# Patient Record
Sex: FEMALE | Race: OTHER | NOT HISPANIC OR LATINO
[De-identification: names, ages, dates, MRNs, and addresses within clinical notes are randomized per-mention and may not be internally consistent; named-entity substitution may affect disease eponyms.]

---

## 2020-09-22 ENCOUNTER — APPOINTMENT (OUTPATIENT)
Dept: CARDIOLOGY | Facility: CLINIC | Age: 60
End: 2020-09-22
Payer: COMMERCIAL

## 2020-09-22 ENCOUNTER — NON-APPOINTMENT (OUTPATIENT)
Age: 60
End: 2020-09-22

## 2020-09-22 ENCOUNTER — OUTPATIENT (OUTPATIENT)
Dept: OUTPATIENT SERVICES | Facility: HOSPITAL | Age: 60
LOS: 1 days | End: 2020-09-22
Payer: COMMERCIAL

## 2020-09-22 VITALS
BODY MASS INDEX: 24.83 KG/M2 | SYSTOLIC BLOOD PRESSURE: 120 MMHG | WEIGHT: 149 LBS | TEMPERATURE: 98.3 F | HEIGHT: 65 IN | OXYGEN SATURATION: 98 % | HEART RATE: 76 BPM | DIASTOLIC BLOOD PRESSURE: 76 MMHG

## 2020-09-22 DIAGNOSIS — Z72.89 OTHER PROBLEMS RELATED TO LIFESTYLE: ICD-10-CM

## 2020-09-22 DIAGNOSIS — Z78.9 OTHER SPECIFIED HEALTH STATUS: ICD-10-CM

## 2020-09-22 DIAGNOSIS — Z83.42 FAMILY HISTORY OF FAMILIAL HYPERCHOLESTEROLEMIA: ICD-10-CM

## 2020-09-22 DIAGNOSIS — Z82.49 FAMILY HISTORY OF ISCHEMIC HEART DISEASE AND OTHER DISEASES OF THE CIRCULATORY SYSTEM: ICD-10-CM

## 2020-09-22 PROBLEM — Z00.00 ENCOUNTER FOR PREVENTIVE HEALTH EXAMINATION: Status: ACTIVE | Noted: 2020-09-22

## 2020-09-22 PROCEDURE — 93454 CORONARY ARTERY ANGIO S&I: CPT | Mod: 26,59

## 2020-09-22 PROCEDURE — 99205 OFFICE O/P NEW HI 60 MIN: CPT | Mod: 57

## 2020-09-22 PROCEDURE — 92928 PRQ TCAT PLMT NTRAC ST 1 LES: CPT | Mod: LD

## 2020-09-22 PROCEDURE — 99285 EMERGENCY DEPT VISIT HI MDM: CPT

## 2020-09-22 PROCEDURE — 71045 X-RAY EXAM CHEST 1 VIEW: CPT | Mod: 26

## 2020-09-22 PROCEDURE — 93000 ELECTROCARDIOGRAM COMPLETE: CPT

## 2020-09-22 PROCEDURE — 93571 IV DOP VEL&/PRESS C FLO 1ST: CPT | Mod: 26,LD

## 2020-09-22 PROCEDURE — 92978 ENDOLUMINL IVUS OCT C 1ST: CPT | Mod: 26,LD

## 2020-09-22 PROCEDURE — 93010 ELECTROCARDIOGRAM REPORT: CPT | Mod: 76

## 2020-09-22 RX ORDER — TRAZODONE HYDROCHLORIDE 50 MG/1
50 TABLET ORAL
Refills: 0 | Status: ACTIVE | COMMUNITY

## 2020-09-22 NOTE — REASON FOR VISIT
[Initial Evaluation] : an initial evaluation of [Chest Pain] : chest pain [Hyperlipidemia] : hyperlipidemia [Admitted for Heart Failure] : patient was not admitted for heart failure [FreeTextEntry1] : concern for unstable angina

## 2020-09-22 NOTE — HISTORY OF PRESENT ILLNESS
[FreeTextEntry1] : 59 year old female with self reported history of mixed hyperlipidemia on praluent uncontrolled recently doubled dose, "cardiac calcifications" here for evaluation of acute onset of chest pressure which occurred Saturday while in [place] with residual soreness. It was a 6/10 radiating to throat and back. She had spoken to a cardiologist in [place] who recommended to "rule out a heart attack" and go to the emergency department. She arrived here in [place] yesterday (Monday night).\par \par She describes the pain as starting in the throat and traveling to chest and back. She had no recurrence since but reports her chest now feels "sore."\par \par EKG today without ischemic changes. Patient denies shortness of breath, orthopnea, PND, LE edema, syncope, near syncope.\par \par We do not know her prior lipid profile but she reports it is still uncontrolled and recently increased the dose. \par

## 2020-09-23 PROCEDURE — 93010 ELECTROCARDIOGRAM REPORT: CPT

## 2020-09-23 PROCEDURE — 93306 TTE W/DOPPLER COMPLETE: CPT | Mod: 26

## 2020-09-23 PROCEDURE — 99233 SBSQ HOSP IP/OBS HIGH 50: CPT

## 2020-09-28 ENCOUNTER — APPOINTMENT (OUTPATIENT)
Dept: CARDIOLOGY | Facility: CLINIC | Age: 60
End: 2020-09-28
Payer: COMMERCIAL

## 2020-09-28 VITALS
HEIGHT: 64 IN | SYSTOLIC BLOOD PRESSURE: 114 MMHG | HEART RATE: 70 BPM | WEIGHT: 140 LBS | BODY MASS INDEX: 23.9 KG/M2 | DIASTOLIC BLOOD PRESSURE: 70 MMHG | OXYGEN SATURATION: 97 % | TEMPERATURE: 98 F

## 2020-09-28 PROCEDURE — 99214 OFFICE O/P EST MOD 30 MIN: CPT

## 2020-09-28 NOTE — HISTORY OF PRESENT ILLNESS
[FreeTextEntry1] : 59 year old female with self reported history of uncontrolled familial hyperlipidemia on praluent recently doubled dose here for post coronary stent follow up. Initially seen last week and sent to ER for unstable angina. Mid LAD FFR positive lesion s/p PCI. Residual non obstructive RCA and distal LCx. Preserved LVEF. Right radial artery access. There is mild eccyhmosis at the access site but no alarm signs. Patent pulses and perfusion, not significantly tender. She reports resolution of the unstable angina symptoms but does report mild soreness in her chest ever since the stent deployment. This has not changed and has no exertional pattern. No angina. Patient denies shortness of breath, orthopnea, PND, LE edema, syncope, near syncope. Tolerating DAPT and praluent. Toprol very expensive. Pending cardiac rehab.\par \par LDL is 181. Scanning in prior cardiology records from [place]. Last LDL in 8/2020 was 160s. Refill of praluent was pending and she was off it for a couple weeks. Peak LDL was 234 and recent shawna 160s.\par

## 2020-09-28 NOTE — REVIEW OF SYSTEMS
[Negative] : Heme/Lymph [Shortness Of Breath] : no shortness of breath [Chest  Pressure] : no chest pressure [Chest Pain] : no chest pain

## 2020-09-28 NOTE — ASSESSMENT
[FreeTextEntry1] : Continue DAPT and higher dose praluent. Cardiac rehab ordered. On BB for now, if no chest pain we can stop this at next visit given preserved LVEF. BP controlled.\par \par Prior records from cardiologist in [place] reviewed. Intolerant to statins and ezetemibe. She recently doubled the dose of praluent and is compliant. She had revascularization of mid LAD without recurrence of her unstable angina symptoms. Asymptomatic from a cardiac standpoint. TTE unremarkable. Right radial artery access site with mild pain and bruising but no redness/swelling, has full ROM, there is no bruit upon auscultation, and patent distal pulses. Can obtain RUE arterial ultrasound if there is persistent mild pain.\par \par Return to clinic mid December with preclinic lipid profile.

## 2020-09-28 NOTE — REASON FOR VISIT
[Chest Pain] : chest pain [Hyperlipidemia] : hyperlipidemia [Follow-Up - Clinic] : a clinic follow-up of [Admitted for Heart Failure] : patient was not admitted for heart failure [FreeTextEntry1] : recent unstable angina, post coronary stent

## 2020-09-28 NOTE — PHYSICAL EXAM
[General Appearance - Well Developed] : well developed [Normal Appearance] : normal appearance [Well Groomed] : well groomed [General Appearance - Well Nourished] : well nourished [No Deformities] : no deformities [General Appearance - In No Acute Distress] : no acute distress [Normal Conjunctiva] : the conjunctiva exhibited no abnormalities [Eyelids - No Xanthelasma] : the eyelids demonstrated no xanthelasmas [Normal Oral Mucosa] : normal oral mucosa [No Oral Pallor] : no oral pallor [No Oral Cyanosis] : no oral cyanosis [Normal Jugular Venous A Waves Present] : normal jugular venous A waves present [Normal Jugular Venous V Waves Present] : normal jugular venous V waves present [No Jugular Venous Martinez A Waves] : no jugular venous martinez A waves [Respiration, Rhythm And Depth] : normal respiratory rhythm and effort [Exaggerated Use Of Accessory Muscles For Inspiration] : no accessory muscle use [Auscultation Breath Sounds / Voice Sounds] : lungs were clear to auscultation bilaterally [Heart Rate And Rhythm] : heart rate and rhythm were normal [Heart Sounds] : normal S1 and S2 [Murmurs] : no murmurs present [Abdomen Soft] : soft [Abdomen Tenderness] : non-tender [Abdomen Mass (___ Cm)] : no abdominal mass palpated [Abnormal Walk] : normal gait [Gait - Sufficient For Exercise Testing] : the gait was sufficient for exercise testing [Nail Clubbing] : no clubbing of the fingernails [Cyanosis, Localized] : no localized cyanosis [Petechial Hemorrhages (___cm)] : no petechial hemorrhages [Skin Color & Pigmentation] : normal skin color and pigmentation [] : no rash [No Venous Stasis] : no venous stasis [Skin Lesions] : no skin lesions [Oriented To Time, Place, And Person] : oriented to person, place, and time [Affect] : the affect was normal [Mood] : the mood was normal [No Anxiety] : not feeling anxious [FreeTextEntry1] : Right radial artery access site with mild pain and bruising but no redness/swelling, has full ROM, there is no bruit upon auscultation, and patent distal pulses.

## 2020-12-14 ENCOUNTER — APPOINTMENT (OUTPATIENT)
Dept: CARDIOLOGY | Facility: CLINIC | Age: 60
End: 2020-12-14
Payer: COMMERCIAL

## 2020-12-14 VITALS
TEMPERATURE: 98.2 F | WEIGHT: 150 LBS | HEART RATE: 78 BPM | OXYGEN SATURATION: 97 % | SYSTOLIC BLOOD PRESSURE: 128 MMHG | HEIGHT: 65 IN | DIASTOLIC BLOOD PRESSURE: 78 MMHG | BODY MASS INDEX: 24.99 KG/M2

## 2020-12-14 DIAGNOSIS — Z86.79 PERSONAL HISTORY OF OTHER DISEASES OF THE CIRCULATORY SYSTEM: ICD-10-CM

## 2020-12-14 PROCEDURE — 99214 OFFICE O/P EST MOD 30 MIN: CPT

## 2020-12-14 PROCEDURE — 99215 OFFICE O/P EST HI 40 MIN: CPT

## 2020-12-14 PROCEDURE — 99072 ADDL SUPL MATRL&STAF TM PHE: CPT

## 2020-12-14 RX ORDER — METOPROLOL SUCCINATE 25 MG/1
25 TABLET, EXTENDED RELEASE ORAL
Refills: 0 | Status: DISCONTINUED | COMMUNITY
End: 2020-12-14

## 2020-12-14 NOTE — HISTORY OF PRESENT ILLNESS
[FreeTextEntry1] : 60 year old female with uncontrolled familial hyperlipidemia on praluent recently doubled dose and HTN here for follow up after labwork. \par \par Since last visit, patient denies chest pressure on exertion, shortness of breath, orthopnea, PND, LE edema, syncope, near syncope.  (was 181 off praluent from 160) on 11/30/20 on praluent. Compliant with medications. Tolerating DAPT. Cardiac rehab report is progressing well, . Peak LDL was 234. \par \par From prior visits: "Initially seen [9/2020] and sent to ER for unstable angina. Mid LAD FFR positive lesion s/p PCI. Residual non obstructive RCA and distal LCx. Preserved LVEF. Right radial artery access."\par

## 2020-12-14 NOTE — REASON FOR VISIT
[Follow-Up - Clinic] : a clinic follow-up of [Hyperlipidemia] : hyperlipidemia [Hypertension] : hypertension [Medication Management] : Medication management [Coronary Artery Disease] : coronary artery disease [Admitted for Heart Failure] : patient was not admitted for heart failure [FreeTextEntry1] : cardiac rehab

## 2020-12-14 NOTE — REVIEW OF SYSTEMS
[Negative] : Heme/Lymph [Shortness Of Breath] : no shortness of breath [Chest  Pressure] : no chest pressure [Palpitations] : no palpitations

## 2020-12-14 NOTE — ASSESSMENT
[FreeTextEntry1] : \par 60 year old female with uncontrolled familial hyperlipidemia on praluent recently doubled dose and HTN here for follow up after labwork.  (was 181 off praluent from 160) on 11/30/20 on praluent. Compliant with medications. Tolerating DAPT. Cardiac rehab report is progressing well, . Peak LDL was 234. \par \par \par Continue DAPT. Switch praluent to repatha and she is praluent resistant. Cardiac rehab doing well;continue. SBP mildly uncontrolled today and was 140 at cardiac rehab. Switch BB to amlodipine 10.\par \par \par From prior records, she is intolerant to statins and zetia. LDL still uncontrolled on double dose of praluent she is praluent resistant. Will switch to repatha. Asymptomatic from a cardiac standpoint. TTE unremarkable. \par \par \par Return to clinic in 4 months and as tolerated with preclinic lipid profile. ER precautions given to patient.

## 2020-12-14 NOTE — PHYSICAL EXAM
[General Appearance - Well Developed] : well developed [Normal Appearance] : normal appearance [Well Groomed] : well groomed [General Appearance - Well Nourished] : well nourished [No Deformities] : no deformities [General Appearance - In No Acute Distress] : no acute distress [Normal Conjunctiva] : the conjunctiva exhibited no abnormalities [Eyelids - No Xanthelasma] : the eyelids demonstrated no xanthelasmas [Normal Oral Mucosa] : normal oral mucosa [No Oral Pallor] : no oral pallor [No Oral Cyanosis] : no oral cyanosis [Normal Jugular Venous A Waves Present] : normal jugular venous A waves present [Normal Jugular Venous V Waves Present] : normal jugular venous V waves present [No Jugular Venous Martinez A Waves] : no jugular venous martinez A waves [Respiration, Rhythm And Depth] : normal respiratory rhythm and effort [Exaggerated Use Of Accessory Muscles For Inspiration] : no accessory muscle use [Auscultation Breath Sounds / Voice Sounds] : lungs were clear to auscultation bilaterally [Heart Rate And Rhythm] : heart rate and rhythm were normal [Heart Sounds] : normal S1 and S2 [Murmurs] : no murmurs present [Abdomen Soft] : soft [Abdomen Tenderness] : non-tender [Abdomen Mass (___ Cm)] : no abdominal mass palpated [Abnormal Walk] : normal gait [Gait - Sufficient For Exercise Testing] : the gait was sufficient for exercise testing [Nail Clubbing] : no clubbing of the fingernails [Cyanosis, Localized] : no localized cyanosis [Petechial Hemorrhages (___cm)] : no petechial hemorrhages [Skin Color & Pigmentation] : normal skin color and pigmentation [] : no rash [No Venous Stasis] : no venous stasis [Skin Lesions] : no skin lesions [Oriented To Time, Place, And Person] : oriented to person, place, and time [Affect] : the affect was normal [Mood] : the mood was normal [No Anxiety] : not feeling anxious [FreeTextEntry1] : full range of motion

## 2021-01-26 ENCOUNTER — EMERGENCY (EMERGENCY)
Facility: HOSPITAL | Age: 61
LOS: 1 days | Discharge: HOME | End: 2021-01-26
Admitting: EMERGENCY MEDICINE
Payer: COMMERCIAL

## 2021-01-26 VITALS
TEMPERATURE: 99 F | DIASTOLIC BLOOD PRESSURE: 80 MMHG | HEART RATE: 78 BPM | SYSTOLIC BLOOD PRESSURE: 124 MMHG | RESPIRATION RATE: 16 BRPM | OXYGEN SATURATION: 98 %

## 2021-01-26 DIAGNOSIS — Z20.822 CONTACT WITH AND (SUSPECTED) EXPOSURE TO COVID-19: ICD-10-CM

## 2021-01-26 LAB — SARS-COV-2 RNA SPEC QL NAA+PROBE: SIGNIFICANT CHANGE UP

## 2021-01-26 PROCEDURE — U0005: CPT

## 2021-01-26 PROCEDURE — U0003: CPT

## 2021-01-26 PROCEDURE — 99283 EMERGENCY DEPT VISIT LOW MDM: CPT

## 2021-01-26 NOTE — ED PROVIDER NOTE - PATIENT PORTAL LINK FT
You can access the FollowMyHealth Patient Portal offered by [facility] by registering at the following website: [email or web address]. By joining [facility]’s FollowMyHealth portal, you will also be able to view your health information using other applications (apps) compatible with our system.

## 2021-02-12 ENCOUNTER — NON-APPOINTMENT (OUTPATIENT)
Age: 61
End: 2021-02-12

## 2021-02-17 ENCOUNTER — NON-APPOINTMENT (OUTPATIENT)
Age: 61
End: 2021-02-17

## 2021-03-09 ENCOUNTER — NON-APPOINTMENT (OUTPATIENT)
Age: 61
End: 2021-03-09

## 2021-05-12 ENCOUNTER — NON-APPOINTMENT (OUTPATIENT)
Age: 61
End: 2021-05-12

## 2021-05-12 ENCOUNTER — APPOINTMENT (OUTPATIENT)
Dept: CARDIOLOGY | Facility: CLINIC | Age: 61
End: 2021-05-12
Payer: COMMERCIAL

## 2021-05-12 VITALS
HEIGHT: 65 IN | OXYGEN SATURATION: 98 % | HEART RATE: 82 BPM | DIASTOLIC BLOOD PRESSURE: 70 MMHG | SYSTOLIC BLOOD PRESSURE: 110 MMHG | BODY MASS INDEX: 25.33 KG/M2 | WEIGHT: 152 LBS

## 2021-05-12 PROCEDURE — 99072 ADDL SUPL MATRL&STAF TM PHE: CPT

## 2021-05-12 PROCEDURE — 93000 ELECTROCARDIOGRAM COMPLETE: CPT

## 2021-05-12 PROCEDURE — 93242 EXT ECG>48HR<7D RECORDING: CPT

## 2021-05-12 PROCEDURE — 99214 OFFICE O/P EST MOD 30 MIN: CPT

## 2021-05-12 RX ORDER — ASPIRIN 81 MG
81 TABLET, DELAYED RELEASE (ENTERIC COATED) ORAL DAILY
Refills: 0 | Status: DISCONTINUED | COMMUNITY
End: 2021-05-12

## 2021-05-12 RX ORDER — ALIROCUMAB 150 MG/ML
150 INJECTION, SOLUTION SUBCUTANEOUS
Refills: 0 | Status: DISCONTINUED | COMMUNITY
End: 2021-05-12

## 2021-05-12 RX ORDER — ALIROCUMAB 150 MG/ML
150 INJECTION, SOLUTION SUBCUTANEOUS
Qty: 2 | Refills: 0 | Status: DISCONTINUED | COMMUNITY
Start: 2020-07-16 | End: 2021-05-12

## 2021-05-13 NOTE — REASON FOR VISIT
[Follow-Up - Clinic] : a clinic follow-up of [Coronary Artery Disease] : coronary artery disease [Hyperlipidemia] : hyperlipidemia [Hypertension] : hypertension [Medication Management] : Medication management [Admitted for Heart Failure] : patient was not admitted for heart failure [FreeTextEntry1] : cardiac rehab

## 2021-05-13 NOTE — ASSESSMENT
[FreeTextEntry1] : BARB MANUEL  is a 60 year F  who presents today May 12, 2021 with the above history and the following active issues. \par \par Uncontrolled familial hyperlipidemia now off praluent and tolerating Repatha. Compliant with medications. Follow-up  blood work scheduled in the near future. Low cholesterol diet. Lifestyle and risk factor modification. \par From prior records, she is intolerant to statins and zetia. \par \par CAD status post PCI in September 2020. Now complaining of atypical chest pain. Intermittently occuring. 4/10 in intensity. Chest pain free in the office. EKG NSR. Recommend ischemic evaluation with nuclear stress test. \par If pain recurs ER evaluation. Remains on DAPT. Bleeding precautions reviewed. \par \par Palpitations. 1 week ZIO monitor recommended. Avoid stimulants. \par \par Red flag symptoms which would warrant sooner emergent evaluation reviewed with the patient. \par Questions and concerns were addressed and answered. \par \par Sincerely,\par \par Lashell Epps PA-C\par Patients history, testing and plan reviewed with supervising MD: Dr. Crisostomo\par All of the above reviewed with patients cardiologist Dr. Jay over the phone

## 2021-05-13 NOTE — HISTORY OF PRESENT ILLNESS
[FreeTextEntry1] : BARB MANUEL  is a 60 year F  who presents today May 12, 2021 in clinical follow-up. Medications have remained unchanged. She reports intermittent chest pressure to the midsternal region over the past 4 days. There are no aggravating or alleviating factors. Pain is not exertional. She is walking her dogs on a regular basis without complaints. Remains on DAPT without interruption. Now on Repatha and is scheduled to have follow-up blood work with her cardiologist in [place] in the coming month. She also has been experiencing episodes of palpitations described as irregularity in her heartbeat.\par Completed cardiac rehab.\par \par 60 year old female with uncontrolled familial hyperlipidemia on praluent now taking Repatha. \par Initially seen [9/2020] and sent to ER for unstable angina. Mid LAD FFR positive lesion s/p PCI. Residual non obstructive RCA and distal LCx. Preserved LVEF. Right radial artery access.\par \par \par \par

## 2021-05-24 ENCOUNTER — NON-APPOINTMENT (OUTPATIENT)
Age: 61
End: 2021-05-24

## 2021-05-25 ENCOUNTER — APPOINTMENT (OUTPATIENT)
Dept: CARDIOLOGY | Facility: CLINIC | Age: 61
End: 2021-05-25

## 2021-05-28 ENCOUNTER — NON-APPOINTMENT (OUTPATIENT)
Age: 61
End: 2021-05-28

## 2021-06-01 PROCEDURE — 99072 ADDL SUPL MATRL&STAF TM PHE: CPT

## 2021-06-01 PROCEDURE — 93244 EXT ECG>48HR<7D REV&INTERPJ: CPT

## 2021-06-02 ENCOUNTER — NON-APPOINTMENT (OUTPATIENT)
Age: 61
End: 2021-06-02

## 2024-03-21 ENCOUNTER — NON-APPOINTMENT (OUTPATIENT)
Age: 64
End: 2024-03-21

## 2024-03-21 ENCOUNTER — APPOINTMENT (OUTPATIENT)
Dept: CARDIOLOGY | Facility: CLINIC | Age: 64
End: 2024-03-21
Payer: COMMERCIAL

## 2024-03-21 VITALS
HEART RATE: 82 BPM | DIASTOLIC BLOOD PRESSURE: 78 MMHG | SYSTOLIC BLOOD PRESSURE: 148 MMHG | HEIGHT: 65 IN | WEIGHT: 150 LBS | BODY MASS INDEX: 24.99 KG/M2 | OXYGEN SATURATION: 96 %

## 2024-03-21 DIAGNOSIS — M79.605 PAIN IN LEFT LEG: ICD-10-CM

## 2024-03-21 DIAGNOSIS — M79.89 OTHER SPECIFIED SOFT TISSUE DISORDERS: ICD-10-CM

## 2024-03-21 PROCEDURE — 93000 ELECTROCARDIOGRAM COMPLETE: CPT

## 2024-03-21 PROCEDURE — 99214 OFFICE O/P EST MOD 30 MIN: CPT

## 2024-03-21 RX ORDER — TESTOSTERONE CYPIONATE 100 MG/ML
100 VIAL (ML) INTRAMUSCULAR
Refills: 0 | Status: DISCONTINUED | COMMUNITY
End: 2024-03-21

## 2024-03-21 RX ORDER — AMLODIPINE BESYLATE 5 MG/1
5 TABLET ORAL DAILY
Refills: 0 | Status: DISCONTINUED | COMMUNITY
Start: 2020-12-14 | End: 2024-03-21

## 2024-03-21 RX ORDER — EVOLOCUMAB 140 MG/ML
140 INJECTION, SOLUTION SUBCUTANEOUS
Qty: 6 | Refills: 3 | Status: DISCONTINUED | COMMUNITY
Start: 2020-12-14 | End: 2024-03-21

## 2024-03-21 RX ORDER — CLOPIDOGREL BISULFATE 75 MG/1
75 TABLET, FILM COATED ORAL DAILY
Qty: 90 | Refills: 0 | Status: DISCONTINUED | COMMUNITY
End: 2024-03-21

## 2024-04-19 ENCOUNTER — APPOINTMENT (OUTPATIENT)
Dept: VASCULAR SURGERY | Facility: CLINIC | Age: 64
End: 2024-04-19
Payer: COMMERCIAL

## 2024-04-19 ENCOUNTER — APPOINTMENT (OUTPATIENT)
Dept: CARDIOLOGY | Facility: CLINIC | Age: 64
End: 2024-04-19
Payer: COMMERCIAL

## 2024-04-19 VITALS
HEART RATE: 80 BPM | BODY MASS INDEX: 23.32 KG/M2 | DIASTOLIC BLOOD PRESSURE: 74 MMHG | OXYGEN SATURATION: 100 % | HEIGHT: 65 IN | SYSTOLIC BLOOD PRESSURE: 142 MMHG | WEIGHT: 140 LBS

## 2024-04-19 VITALS
HEIGHT: 65 IN | WEIGHT: 140 LBS | BODY MASS INDEX: 23.32 KG/M2 | SYSTOLIC BLOOD PRESSURE: 142 MMHG | RESPIRATION RATE: 14 BRPM | HEART RATE: 80 BPM | OXYGEN SATURATION: 100 % | DIASTOLIC BLOOD PRESSURE: 74 MMHG

## 2024-04-19 PROCEDURE — 99203 OFFICE O/P NEW LOW 30 MIN: CPT

## 2024-04-19 PROCEDURE — 99214 OFFICE O/P EST MOD 30 MIN: CPT

## 2024-04-19 RX ORDER — BEMPEDOIC ACID 180 MG/1
180 TABLET, FILM COATED ORAL
Qty: 90 | Refills: 3 | Status: DISCONTINUED | COMMUNITY
End: 2024-04-19

## 2024-04-20 NOTE — ASSESSMENT
[FreeTextEntry1] : BARB MANUEL is a 63 year old F who presents today for cardiovascular prevention visit, lipid management.   CAD, prior PCI LAD 9/2020 Pt concerned about residual CAD Although she is asymptomatic given residua disease recommend nuclear stress testing to r/o obx CAD, will arrange followup in  office to review with her general cardiologist.  Cont asa, lipid lowering as below  Familial HLD Longstanding uncontrolled HLD Last  with known CAD Currently only on nexletol Intol several statin, zetia, and PCSK9i mAb Patient is not at goal for LDL (LEQVIO (inclisiran) injection is indicated as an adjunct to diet and maximally tolerated statin therapy for the treatment of adults with clinical atherosclerotic cardiovascular disease (ASCVD) or heterozygous familial hypercholesterolemia (HeFH) who require additional lowering of low-density lipoprotein cholesterol (LDL-C).  Will submit for auth and next CVP visit for initial injection in   Thank you for the referral.  Please do not hesitate to call for any questions or concerns.   Sincerely,  JOSUE Flor-C Patients history, testing, and plan reviewed with supervising MD: Dr. Benito Marques

## 2024-04-20 NOTE — HISTORY OF PRESENT ILLNESS
[FreeTextEntry1] : BARB MANUEL is a 63 year old F who presents today for cardiovascular prevention visit.   PMH CAD, PCI to LAD 9/2020, familial HLD presents to discuss cholesterol management.   Reviewed cardiac catheterization 9/2020 successful PCI to LAD lesion with residual 65% LCX and 50% RCA stenosis.  Her lipids have been elevated given multiple med intolerances over the years. Has had management in her other home in [place] but plans to move to [facility] full time.  She reports trying 5 different statins all which resulted in body pain and joint aches.  She reports zetia had similar effect.  She was recently started on nexletol which she seems to tolerate well.  She tried both Praluent and Repatha which resulted in flu like symptoms.   Last lipids 2/2024   She otherwise feels well. Remains active. She denies chest pain, pressure, palpitations, unusual shortness of breath, orthopnea, LE edema, lightheadedness, dizziness, near syncope or syncope. Continues to take aspirin.

## 2024-05-02 ENCOUNTER — APPOINTMENT (OUTPATIENT)
Dept: CARDIOLOGY | Facility: CLINIC | Age: 64
End: 2024-05-02

## 2024-05-07 ENCOUNTER — APPOINTMENT (OUTPATIENT)
Dept: CARDIOLOGY | Facility: CLINIC | Age: 64
End: 2024-05-07
Payer: COMMERCIAL

## 2024-05-07 ENCOUNTER — NON-APPOINTMENT (OUTPATIENT)
Age: 64
End: 2024-05-07

## 2024-05-07 PROCEDURE — 93015 CV STRESS TEST SUPVJ I&R: CPT

## 2024-05-07 PROCEDURE — A9502: CPT

## 2024-05-07 PROCEDURE — 78452 HT MUSCLE IMAGE SPECT MULT: CPT

## 2024-05-08 PROBLEM — I83.90 VARICOSITIES OF LEG: Status: ACTIVE | Noted: 2024-03-21

## 2024-05-08 PROBLEM — R00.2 PALPITATIONS: Status: ACTIVE | Noted: 2021-05-12

## 2024-05-09 ENCOUNTER — APPOINTMENT (OUTPATIENT)
Dept: CARDIOLOGY | Facility: CLINIC | Age: 64
End: 2024-05-09
Payer: COMMERCIAL

## 2024-05-09 VITALS
HEIGHT: 65 IN | DIASTOLIC BLOOD PRESSURE: 62 MMHG | SYSTOLIC BLOOD PRESSURE: 120 MMHG | OXYGEN SATURATION: 99 % | BODY MASS INDEX: 23.16 KG/M2 | WEIGHT: 139 LBS | HEART RATE: 78 BPM

## 2024-05-09 DIAGNOSIS — R00.2 PALPITATIONS: ICD-10-CM

## 2024-05-09 DIAGNOSIS — I83.90 ASYMPTOMATIC VARICOSE VEINS OF UNSPECIFIED LOWER EXTREMITY: ICD-10-CM

## 2024-05-09 PROCEDURE — 99214 OFFICE O/P EST MOD 30 MIN: CPT

## 2024-05-11 NOTE — HISTORY OF PRESENT ILLNESS
[FreeTextEntry1] : 64-year-old female with familial hypercholesterolemia previously treated with Praluent and Repatha due to statin and ezetimibe intolerance now back on low-dose rosuvastatin 5 mg every other day, aortic and coronary calcifications, family history of premature CAD unable to achieve LDL target, ACS 9/2020 with diffuse 65% proximal LAD, borderline IFR 0.9, FFR 0.71 with placement of 3.5X 38 mm MEHDI to proximal LAD, residual 60% focal ostial D1 , 65% distal LCx and 50% diffuse mid RCA disease.    She just returned from [place].  Overall there is no change in her functional capacity.  She remains very active doing Pilates, yoga and walking her dogs.  These activities generally well-tolerated without chest discomfort or shortness of breath.  She is very careful with the diet.  Failed therapy with Repatha and Praluent due to flu like symptoms and has been back on rosuvastatin 5 mg.  As previously this is not well-tolerated.  She is having myalgias, aches and stiffness of her joints.  An additional problem is discomfort, swelling/varicositiesof her left lower extremity.  Pain is in the back of her leg and extends into her groin.  She has had sclerotherapy of the lower extremities.  Laboratory from 2/29/2024 shows total cholesterol 269, HDL 75, triglycerides 64 and .  Thyroid function tests including TSH T4 and T3 are normal.  Hemoglobin is 14.9, hematocrit 44.5%.  355.  Potassium is 4.6, sodium 136, creatinine 0.64, EGFR 100 calcium 9.9, LFTs are normal.  FIB-4 index is 0.71 which is considered optimal.  Hemoglobin A1c is 5.3

## 2024-05-11 NOTE — HISTORY OF PRESENT ILLNESS
[FreeTextEntry1] : 64-year-old female with familial hypercholesterolemia previously treated with Praluent and Repatha due to statin and ezetimibe intolerance now back on low-dose rosuvastatin 5 mg every other day, aortic and coronary calcifications, family history of premature CAD unable to achieve LDL target, ACS 9/2020 with diffuse 65% proximal LAD, borderline IFR 0.9, FFR 0.71 with placement of 3.5X 38 mm MEHDI to proximal LAD, residual 60% focal ostial D1 , 65% distal LCx and 50% diffuse mid RCA disease.    She just returned from [place].  Overall there is no change in her functional capacity.  She remains very active doing Pilates, yoga and walking her dogs.  These activities generally well-tolerated without chest discomfort or shortness of breath.  She is very careful with the diet.  Failed therapy with Repatha and Praluent due to flu like symptoms and has been back on rosuvastatin 5 mg.  As previously this is not well-tolerated.  She is having myalgias, aches and stiffness of her joints.  An additional problem is discomfort, swelling/varicositiesof her left lower extremity.  Pain is in the back of her leg and extends into her groin.  She has had sclerotherapy of the lower extremities.  Laboratory from 2/29/2024 shows total cholesterol 269, HDL 75, triglycerides 64 and .  Thyroid function tests including TSH T4 and T3 are normal.  Hemoglobin is 14.9, hematocrit 44.5%.  355.  Potassium is 4.6, sodium 136, creatinine 0.64, EGFR 100 calcium 9.9, LFTs are normal.  FIB-4 index is 0.71 which is considered optimal.  Hemoglobin A1c is 5.3

## 2024-05-11 NOTE — CARDIOLOGY SUMMARY
[de-identified] : 3/21/2024  Sinus Rhythm, rate 78  Low voltage in precordial leads. -Left atrial enlargement.  ABNORMAL [de-identified] : 12/13/2023 sinus, 1.4% VPC's, no SVT or pauses [de-identified] : 12/2016  Normal stress echo, 13.5 METS [de-identified] : 10/23/2023 , HDL 77 and total cholesterol 206

## 2024-05-11 NOTE — ASSESSMENT
[FreeTextEntry1] : BARB MANUEL  is a 60 year F  with uncontrolled familial hyperlipidemia now off praluent and Repatha.  She is not tolerating repeat trial with low-dose statin.  There is also history of intolerance to ezetimibe.  Plan is referral to cardiovascular prevention clinic for trial with Leqvio.  Pending initiation of Leqvio she was provided with samples of bempedoic acid 10 mg daily.  We also reviewed the importance of maintaining a healthy lifestyle including regular exercise diet and achieving ideal weight.  A new problem swelling and pain of her left lower extremity.  I have arranged for a ultrasound duplex of the left lower extremity to be done today.  She has a history of multiple interventions on her lower extremity veins and referral will also be made to vascular surgery.  We will check the results of her lower extremity duplex today and schedule cardiology follow-up will be in 1 month or sooner prn.

## 2024-05-11 NOTE — PHYSICAL EXAM
[Well Developed] : well developed [Well Nourished] : well nourished [No Acute Distress] : no acute distress [Normal Conjunctiva] : normal conjunctiva [Normal Venous Pressure] : normal venous pressure [No Carotid Bruit] : no carotid bruit [Normal S1, S2] : normal S1, S2 [No Murmur] : no murmur [No Rub] : no rub [No Gallop] : no gallop [Clear Lung Fields] : clear lung fields [Good Air Entry] : good air entry [No Respiratory Distress] : no respiratory distress  [Soft] : abdomen soft [Non Tender] : non-tender [Normal Bowel Sounds] : normal bowel sounds [Normal Gait] : normal gait [No Edema] : no edema [No Cyanosis] : no cyanosis [No Clubbing] : no clubbing [Normal PT B/L] : normal PT B/L [Normal DP B/L] : normal DP B/L [No Rash] : no rash [Moves all extremities] : moves all extremities [No Focal Deficits] : no focal deficits [Normal Speech] : normal speech [Alert and Oriented] : alert and oriented [Normal memory] : normal memory [FreeTextEntry1] : full range of motion [de-identified] : Varicosities bilateral, L>R, left leg larger

## 2024-05-11 NOTE — CARDIOLOGY SUMMARY
[de-identified] : 3/21/2024  Sinus Rhythm, rate 78  Low voltage in precordial leads. -Left atrial enlargement.  ABNORMAL [de-identified] : 12/13/2023 sinus, 1.4% VPC's, no SVT or pauses [de-identified] : 12/2016  Normal stress echo, 13.5 METS [de-identified] : 10/23/2023 , HDL 77 and total cholesterol 206

## 2024-05-11 NOTE — PHYSICAL EXAM
[Well Developed] : well developed [Well Nourished] : well nourished [No Acute Distress] : no acute distress [Normal Conjunctiva] : normal conjunctiva [Normal Venous Pressure] : normal venous pressure [No Carotid Bruit] : no carotid bruit [Normal S1, S2] : normal S1, S2 [No Murmur] : no murmur [No Rub] : no rub [No Gallop] : no gallop [Clear Lung Fields] : clear lung fields [Good Air Entry] : good air entry [No Respiratory Distress] : no respiratory distress  [Soft] : abdomen soft [Non Tender] : non-tender [Normal Bowel Sounds] : normal bowel sounds [Normal Gait] : normal gait [No Edema] : no edema [No Cyanosis] : no cyanosis [No Clubbing] : no clubbing [Normal PT B/L] : normal PT B/L [Normal DP B/L] : normal DP B/L [No Rash] : no rash [Moves all extremities] : moves all extremities [No Focal Deficits] : no focal deficits [Normal Speech] : normal speech [Alert and Oriented] : alert and oriented [Normal memory] : normal memory [FreeTextEntry1] : full range of motion [de-identified] : Varicosities bilateral, L>R, left leg larger

## 2024-05-13 NOTE — DISCUSSION/SUMMARY
[FreeTextEntry1] : 1.  Maintain healthy lifestyle 2.  Continue present Rx if tolerated double metoprolol succinate to 50 mg daily. 3.  Left heart catheterization, coronary angiography and possible PCI/FFR. 4.  Referral to electrophysiology after coronary angiography. 5.  Waiting approval for inclisiran.   I spent 35 minutes with the patient including: 10 minutes preparing for the visit 15 minutes face to face and 10 minutes on documentation and coordination of care.

## 2024-05-13 NOTE — CARDIOLOGY SUMMARY
[de-identified] : 3/21/2024  Sinus Rhythm, rate 78  Low voltage in precordial leads. -Left atrial enlargement.  ABNORMAL [de-identified] : 12/13/2023 sinus, 1.4% VPC's, no SVT or pauses [de-identified] : 12/2016  Normal stress echo, 13.5 METS [de-identified] : MPI 5/7/2024.  She exercised for 5 minutes 18 seconds, 6 METS. No chest pain. LVEF post stress 66%. No definite ischemia or infarction but stopped with 5 second run of ventricular tachycardia, initially polymorphic, rate 214 bpm. [de-identified] : 10/23/2023 , HDL 77 and total cholesterol 206

## 2024-05-13 NOTE — PHYSICAL EXAM
[Well Developed] : well developed [Well Nourished] : well nourished [No Acute Distress] : no acute distress [Normal Conjunctiva] : normal conjunctiva [Normal Venous Pressure] : normal venous pressure [No Carotid Bruit] : no carotid bruit [Normal S1, S2] : normal S1, S2 [No Murmur] : no murmur [No Rub] : no rub [No Gallop] : no gallop [Clear Lung Fields] : clear lung fields [Good Air Entry] : good air entry [No Respiratory Distress] : no respiratory distress  [Soft] : abdomen soft [Non Tender] : non-tender [Normal Bowel Sounds] : normal bowel sounds [Normal Gait] : normal gait [No Edema] : no edema [No Cyanosis] : no cyanosis [No Clubbing] : no clubbing [Normal PT B/L] : normal PT B/L [Normal DP B/L] : normal DP B/L [No Rash] : no rash [Moves all extremities] : moves all extremities [No Focal Deficits] : no focal deficits [Normal Speech] : normal speech [Alert and Oriented] : alert and oriented [Normal memory] : normal memory [Appears Anxious] : appears anxious [FreeTextEntry1] : full range of motion [de-identified] : Varicosities bilateral, L>R, left leg larger

## 2024-05-13 NOTE — HISTORY OF PRESENT ILLNESS
[FreeTextEntry1] : 64-year-old female with familial hypercholesterolemia previously treated with Praluent and Repatha due to statin and ezetimibe intolerance now back on low-dose rosuvastatin 5 mg every other day, aortic and coronary calcifications, family history of premature CAD unable to achieve LDL target, ACS 9/2020 with diffuse 65% proximal LAD, borderline IFR 0.9, FFR 0.71 with placement of 3.5X 38 mm MEHDI to proximal LAD, residual 60% focal ostial D1 , 65% distal LCx and 50% diffuse mid RCA disease.    She just returned from [place].  Overall there is no change in her functional capacity.  She remains very active doing Pilates, yoga and walking her dogs.  These activities generally well-tolerated without chest discomfort or shortness of breath.  She is very careful with the diet.  Failed therapy with Repatha and Praluent due to flu like symptoms and resumed rosuvastatin 5 mg.  As previously not well-tolerated with myalgias, aches and stiffness of her joints.  An additional problem is discomfort, swelling/varicosities of left lower extremity.  Pain is in the back of leg and extends into groin.  She has had sclerotherapy of the lower extremities.  Laboratory from 2/29/2024 shows total cholesterol 269, HDL 75, triglycerides 64 and .  Thyroid function tests including TSH T4 and T3 are normal.  Hemoglobin is 14.9, hematocrit 44.5%.  355.  Potassium is 4.6, sodium 136, creatinine 0.64, EGFR 100 calcium 9.9, LFTs are normal.  FIB-4 index is 0.71 which is considered optimal.  Hemoglobin A1c is 5.3

## 2024-05-13 NOTE — ASSESSMENT
[FreeTextEntry1] : 60 year F with CAD, ACS 9/2020 with diffuse 65% proximal LAD, borderline IFR 0.9, FFR 0.71 with placement of 3.5X 38 mm MEHDI to proximal LAD, residual 60% focal ostial D1, 65% distal LCx and 50% diffuse mid RCA disease, uncontrolled familial hyperlipidemia with intolerance to multiple agents now on bempedoic acid with  mg/dl awaiting approval for inclisiran.  Recent MPI without scintigraphic evidence for ischemia or infarction but notable for 5-second run of non-sustained ventricular tachycardia rate 214 bpm initially polymorphic in appearance.

## 2024-05-16 DIAGNOSIS — Z01.818 ENCOUNTER FOR OTHER PREPROCEDURAL EXAMINATION: ICD-10-CM

## 2024-06-04 ENCOUNTER — APPOINTMENT (OUTPATIENT)
Dept: VASCULAR SURGERY | Facility: CLINIC | Age: 64
End: 2024-06-04

## 2024-06-05 ENCOUNTER — APPOINTMENT (OUTPATIENT)
Dept: CARDIOLOGY | Facility: CLINIC | Age: 64
End: 2024-06-05

## 2024-06-07 ENCOUNTER — APPOINTMENT (OUTPATIENT)
Dept: CARDIOLOGY | Facility: CLINIC | Age: 64
End: 2024-06-07
Payer: COMMERCIAL

## 2024-06-07 ENCOUNTER — APPOINTMENT (OUTPATIENT)
Dept: VASCULAR SURGERY | Facility: CLINIC | Age: 64
End: 2024-06-07

## 2024-06-07 ENCOUNTER — NON-APPOINTMENT (OUTPATIENT)
Age: 64
End: 2024-06-07

## 2024-06-07 VITALS
DIASTOLIC BLOOD PRESSURE: 60 MMHG | HEART RATE: 76 BPM | BODY MASS INDEX: 21.99 KG/M2 | SYSTOLIC BLOOD PRESSURE: 110 MMHG | WEIGHT: 132 LBS | OXYGEN SATURATION: 99 % | HEIGHT: 65 IN

## 2024-06-07 DIAGNOSIS — I47.29 OTHER VENTRICULAR TACHYCARDIA: ICD-10-CM

## 2024-06-07 DIAGNOSIS — Z95.5 PRESENCE OF CORONARY ANGIOPLASTY IMPLANT AND GRAFT: ICD-10-CM

## 2024-06-07 DIAGNOSIS — Z78.9 OTHER SPECIFIED HEALTH STATUS: ICD-10-CM

## 2024-06-07 DIAGNOSIS — I10 ESSENTIAL (PRIMARY) HYPERTENSION: ICD-10-CM

## 2024-06-07 PROCEDURE — 99215 OFFICE O/P EST HI 40 MIN: CPT

## 2024-06-07 PROCEDURE — G2211 COMPLEX E/M VISIT ADD ON: CPT

## 2024-06-07 PROCEDURE — 93000 ELECTROCARDIOGRAM COMPLETE: CPT

## 2024-06-07 RX ORDER — METOPROLOL SUCCINATE 25 MG/1
25 TABLET, EXTENDED RELEASE ORAL
Qty: 90 | Refills: 2 | Status: ACTIVE | COMMUNITY
Start: 1900-01-01 | End: 1900-01-01

## 2024-06-07 RX ORDER — METOPROLOL SUCCINATE 25 MG/1
25 TABLET, EXTENDED RELEASE ORAL DAILY
Qty: 180 | Refills: 3 | Status: DISCONTINUED | COMMUNITY
Start: 2020-09-28 | End: 2024-06-07

## 2024-06-07 RX ORDER — ACETAMINOPHEN/DIPHENHYDRAMINE 500MG-25MG
TABLET ORAL
Refills: 0 | Status: DISCONTINUED | COMMUNITY
End: 2024-06-07

## 2024-06-07 RX ORDER — ROSUVASTATIN CALCIUM 5 MG/1
5 TABLET, FILM COATED ORAL DAILY
Refills: 0 | Status: ACTIVE | COMMUNITY

## 2024-06-07 RX ORDER — ASPIRIN ENTERIC COATED TABLETS 81 MG 81 MG/1
81 TABLET, DELAYED RELEASE ORAL DAILY
Qty: 90 | Refills: 3 | Status: ACTIVE | COMMUNITY
Start: 2020-09-28 | End: 1900-01-01

## 2024-06-07 RX ORDER — MENTHOL/CAMPHOR 0.5 %-0.5%
1000 LOTION (ML) TOPICAL
Refills: 0 | Status: DISCONTINUED | COMMUNITY
End: 2024-06-07

## 2024-06-07 RX ORDER — ACETAMINOPHEN 325 MG
TABLET ORAL
Refills: 0 | Status: DISCONTINUED | COMMUNITY
End: 2024-06-07

## 2024-06-07 RX ORDER — BEMPEDOIC ACID 180 MG/1
180 TABLET, FILM COATED ORAL
Qty: 90 | Refills: 1 | Status: DISCONTINUED | COMMUNITY
Start: 2024-03-21 | End: 2024-06-07

## 2024-06-07 RX ORDER — DIAZEPAM 5 MG/1
5 TABLET ORAL
Qty: 4 | Refills: 0 | Status: DISCONTINUED | COMMUNITY
Start: 2024-05-07 | End: 2024-06-07

## 2024-06-07 NOTE — CARDIOLOGY SUMMARY
[de-identified] : 06/07/24: SR with LAE 03/21/24: SR with LAE [de-identified] : Remote Monitor (12/13/23): sinus, 1.4% VPC's, no SVT or pauses   [de-identified] : Nuclear Stress Test (05/2024):  1. Myocardial Perfusion: Abnormal. 2. ECG changes: Negative for ischemia by ST segment criteria, but 5 seconds of polymorphic VT at 214BPM. Metoprolol restarted. 3. Myocardial perfusion images show no definite perfusion abnormalities. 4. The post stress left ventricular EF is 66 %.  Exercise Stress Echo (12/2016): Normal. 13.5 METS [de-identified] : Labs (02/29/24): Total cholesterol 269, HDL 75, triglycerides 64 and . TSH T4 and T3 are normal. Hgb 14.9, Potassium 4.6, sodium 136, creatinine 0.64, EGFR 100 calcium 9.9, LFTs are normal. FIB-4 index is 0.71 which is considered optimal. Hemoglobin A1c is 5.3

## 2024-06-07 NOTE — REASON FOR VISIT
[FreeTextEntry3] :  Camilo Jay MD (Interventional Cardiologist) Stormy Hidalgo NP (Lipid Specialist)

## 2024-06-07 NOTE — DISCUSSION/SUMMARY
[FreeTextEntry1] :  63 F with FH, HTN, obstructive CAD s/p PCI of LAD and more recently ostial RCA on DAPT x 1 year (ASA / ticagrelor) with plans for Leqvio (appt Monday 06/10/24) -- presenting for post-cath check and further workup / management of NSVT.   Plan for exercise stress nuclear test (to compare to equivalent prior stress test) in six weeks + cardiac MRI to evaluate for scar / LGE of ischemia to ascertain etiology of VE.   Refilled ASA, ticagrelor per patient's preference. She has enough of the rosuvastatin 5 mg daily which may be discontinued if she keeps her Monday appt w/ Leqvio.   Labs ordered to be done prior to 2 month follow-up (after MRI and stress test)  Silverio Campos MD, [facility] Interventional Cardiology   Appreciate the opportunity to participate in the care of Ms. MANUEL. Strict ER precautions were provided to patient for symptoms that arise or worsen. Please do not hesitate to reach out with any questions, concerns, or changes in clinical status.   Silverio Campos MD, [facility]  Interventional & Clinical Cardiology  Altogether, I had the privilege of spending 50 minutes on this patient's care, more than 50% of which was during a face-to-face encounter. This does not include time required to obtain/interpret an ECG or time invested in the education of medical trainees who may have participated in the patient's care.   [EKG obtained to assist in diagnosis and management of assessed problem(s)] : EKG obtained to assist in diagnosis and management of assessed problem(s)

## 2024-06-07 NOTE — HISTORY OF PRESENT ILLNESS
[FreeTextEntry1] :  63 year old woman with HTN, FH, obstructive CAD c/b ACS s/p PCI of pLAD (3.5 x 38 mm, 09/2020) and recently ostial RCA (05/2024), NSVT, and statin intolerance who presents for post-cath follow-up.   Patient is highly active and travels frequently. Asymptomatic post cath. No palpitations at present. Discussed medications necessary post-PCI and further workup advised by Dr. Jay re ventricular ectopy. Additionally, patient was re-scheduled with Stormy Hidalgo NP on Monday 06/10/24 for Leqvio.

## 2024-06-08 ENCOUNTER — NON-APPOINTMENT (OUTPATIENT)
Age: 64
End: 2024-06-08

## 2024-06-10 ENCOUNTER — APPOINTMENT (OUTPATIENT)
Dept: CARDIOLOGY | Facility: CLINIC | Age: 64
End: 2024-06-10
Payer: COMMERCIAL

## 2024-06-10 VITALS
SYSTOLIC BLOOD PRESSURE: 114 MMHG | HEIGHT: 65 IN | HEART RATE: 76 BPM | OXYGEN SATURATION: 98 % | WEIGHT: 132 LBS | DIASTOLIC BLOOD PRESSURE: 76 MMHG | BODY MASS INDEX: 21.99 KG/M2

## 2024-06-10 DIAGNOSIS — Z87.898 PERSONAL HISTORY OF OTHER SPECIFIED CONDITIONS: ICD-10-CM

## 2024-06-10 DIAGNOSIS — E78.00 PURE HYPERCHOLESTEROLEMIA, UNSPECIFIED: ICD-10-CM

## 2024-06-10 DIAGNOSIS — E78.49 OTHER HYPERLIPIDEMIA: ICD-10-CM

## 2024-06-10 DIAGNOSIS — I25.10 ATHEROSCLEROTIC HEART DISEASE OF NATIVE CORONARY ARTERY W/OUT ANGINA PECTORIS: ICD-10-CM

## 2024-06-10 PROCEDURE — 99213 OFFICE O/P EST LOW 20 MIN: CPT | Mod: 25

## 2024-06-10 PROCEDURE — 96372 THER/PROPH/DIAG INJ SC/IM: CPT | Mod: JZ

## 2024-06-10 RX ORDER — INCLISIRAN 284 MG/1.5ML
284 INJECTION, SOLUTION SUBCUTANEOUS
Refills: 0 | Status: COMPLETED | OUTPATIENT
Start: 2024-06-10

## 2024-06-10 RX ADMIN — INCLISIRAN 1.5 MG/1.5ML: 284 INJECTION, SOLUTION SUBCUTANEOUS at 00:00

## 2024-06-10 NOTE — HISTORY OF PRESENT ILLNESS
[FreeTextEntry1] : BARB MANUEL is a 63 year old F who presents today for cardiovascular prevention visit.   PMH CAD, PCI to LAD 9/2020, familial HLD presents to discuss cholesterol management. Presents today for initial Leqvio shot.   Reviewed cardiac catheterization 9/2020 successful PCI to LAD lesion with residual 65% LCX and 50% RCA stenosis.  Her lipids have been elevated given multiple med intolerances over the years. Has had management in her other home in [place] but plans to move to [facility] full time.  She reports trying 5 different statins all which resulted in body pain and joint aches.  She reports zetia had similar effect.  She was recently started on nexletol which she seems to tolerate well.  She tried both Praluent and Repatha which resulted in flu like symptoms.  Last lipids 2/2024  Most recently had abnormal nuclear stress test, then cardiac cath with PCI to ostial RCA (05/2024). She has been taking low dose crestor 5mg daily since then.   She otherwise feels well. Remains active. She denies chest pain, pressure, palpitations, unusual shortness of breath, orthopnea, LE edema, lightheadedness, dizziness, near syncope or syncope. Continues to take aspirin.

## 2024-06-24 DIAGNOSIS — Z95.5 PRESENCE OF CORONARY ANGIOPLASTY IMPLANT AND GRAFT: ICD-10-CM

## 2024-06-24 RX ORDER — TICAGRELOR 90 MG/1
90 TABLET ORAL TWICE DAILY
Qty: 180 | Refills: 3 | Status: DISCONTINUED | COMMUNITY
Start: 2024-06-07 | End: 2024-06-24

## 2024-06-24 RX ORDER — APIXABAN 5 MG/1
5 TABLET, FILM COATED ORAL
Qty: 60 | Refills: 5 | Status: ACTIVE | COMMUNITY
Start: 2024-06-24 | End: 1900-01-01

## 2024-06-24 RX ORDER — CLOPIDOGREL BISULFATE 75 MG/1
75 TABLET, FILM COATED ORAL
Qty: 90 | Refills: 2 | Status: ACTIVE | COMMUNITY
Start: 2024-06-24 | End: 1900-01-01

## 2024-06-27 ENCOUNTER — NON-APPOINTMENT (OUTPATIENT)
Age: 64
End: 2024-06-27

## 2024-06-28 ENCOUNTER — APPOINTMENT (OUTPATIENT)
Dept: CT IMAGING | Facility: HOSPITAL | Age: 64
End: 2024-06-28

## 2024-06-28 ENCOUNTER — NON-APPOINTMENT (OUTPATIENT)
Age: 64
End: 2024-06-28

## 2024-06-28 ENCOUNTER — OUTPATIENT (OUTPATIENT)
Dept: OUTPATIENT SERVICES | Facility: HOSPITAL | Age: 64
LOS: 1 days | End: 2024-06-28
Payer: COMMERCIAL

## 2024-06-28 PROCEDURE — 75574 CT ANGIO HRT W/3D IMAGE: CPT | Mod: 26

## 2024-06-28 PROCEDURE — 82565 ASSAY OF CREATININE: CPT

## 2024-06-28 PROCEDURE — 75574 CT ANGIO HRT W/3D IMAGE: CPT

## 2024-06-29 ENCOUNTER — NON-APPOINTMENT (OUTPATIENT)
Age: 64
End: 2024-06-29

## 2024-06-30 ENCOUNTER — NON-APPOINTMENT (OUTPATIENT)
Age: 64
End: 2024-06-30

## 2024-07-30 ENCOUNTER — APPOINTMENT (OUTPATIENT)
Dept: CARDIOLOGY | Facility: CLINIC | Age: 64
End: 2024-07-30
Payer: COMMERCIAL

## 2024-07-30 PROCEDURE — 78452 HT MUSCLE IMAGE SPECT MULT: CPT

## 2024-07-30 PROCEDURE — 93015 CV STRESS TEST SUPVJ I&R: CPT

## 2024-07-30 PROCEDURE — A9502: CPT

## 2024-08-02 ENCOUNTER — APPOINTMENT (OUTPATIENT)
Dept: CARDIOLOGY | Facility: CLINIC | Age: 64
End: 2024-08-02
Payer: COMMERCIAL

## 2024-08-02 PROCEDURE — 99443: CPT

## 2024-08-02 PROCEDURE — G2211 COMPLEX E/M VISIT ADD ON: CPT | Mod: NC

## 2024-08-02 NOTE — HISTORY OF PRESENT ILLNESS
[FreeTextEntry1] : 63 year old woman with PCI x 2 to RCA (Dr. Jay) on Leqvio who presents for follow-up.  Doing well, completed CCTA , cardiac MRI, and recent nuclear stress test with reassuring results.  Questions answered, labs reviewed -- lipid control is optimized on leqvio. Issues with muscle spasms of foot. Rec Mag and bananas.   OK to proceed with vascular procedure that was put on hold with Dr. Orozco.

## 2024-08-02 NOTE — DISCUSSION/SUMMARY
[FreeTextEntry1] :  63 year old woman with PCI x 2 (Dr. Jay) on ASA / clopidogrel/Leqvio, doing well.   Msg'ed Dr. Orozco that patient is optimized from cardiac perspective for vascular procedure -- would continue DAPT until 06/01/2025, but OK to temporarily interrupt for procedures after speaking to interventionalist Dr. Jay who performed the PCI.   Refilled prescriptions to mail order.   Follow up with Stormy Hidalgo for Leqvio in Sept 2024.  OK to follow-up with me in Jan-Feb 2025.   Silverio Campos MD, [facility] Interventional Cardiology

## 2024-08-02 NOTE — DISCUSSION/SUMMARY
[FreeTextEntry1] :  63 year old woman with PCI x 2 (Dr. Jay) on ASA / clopidogrel/Leqvio, doing well.   Msg'ed Dr. Orozco that patient is optimized from cardiac perspective for vascular procedure -- would continue DAPT until 06/01/2025, but OK to temporarily interrupt for procedures after speaking to interventionalist Dr. Jay who performed the PCI.   Refilled prescriptions to mail order.   Follow up with Stormy Hidalgo for Leqvio in Sept 2024.  OK to follow-up with me in Jan-Feb 2025.   Silverio Campos MD, [facility] Inter[facility]

## 2024-08-20 LAB
CHOLEST SERPL-MCNC: 162
CHOLEST SERPL-MCNC: 239
LDLC SERPL CALC-MCNC: 169
LDLC SERPL CALC-MCNC: 90

## 2024-08-29 RX ORDER — DIAZEPAM 5 MG/1
5 TABLET ORAL
Qty: 4 | Refills: 0 | Status: ACTIVE | COMMUNITY
Start: 2024-08-29 | End: 1900-01-01

## 2024-08-30 RX ORDER — SODIUM BICARBONATE 84 MG/ML
8.4 INJECTION, SOLUTION INTRAVENOUS
Qty: 5 | Refills: 0 | Status: ACTIVE | COMMUNITY
Start: 2024-08-29 | End: 1900-01-01

## 2024-08-30 RX ORDER — LIDOCAINE HYDROCHLORIDE 10 MG/ML
1 INJECTION, SOLUTION INFILTRATION; PERINEURAL
Qty: 500 | Refills: 0 | Status: ACTIVE | COMMUNITY
Start: 2024-08-28 | End: 1900-01-01

## 2024-09-06 ENCOUNTER — APPOINTMENT (OUTPATIENT)
Dept: VASCULAR SURGERY | Facility: CLINIC | Age: 64
End: 2024-09-06
Payer: COMMERCIAL

## 2024-09-06 VITALS
HEART RATE: 67 BPM | WEIGHT: 130 LBS | SYSTOLIC BLOOD PRESSURE: 110 MMHG | DIASTOLIC BLOOD PRESSURE: 72 MMHG | OXYGEN SATURATION: 98 % | BODY MASS INDEX: 22.2 KG/M2 | HEIGHT: 64 IN

## 2024-09-06 DIAGNOSIS — I83.12 VARICOSE VEINS OF LEFT LOWER EXTREMITY WITH INFLAMMATION: ICD-10-CM

## 2024-09-06 PROCEDURE — 37799 UNLISTED PX VASCULAR SURGERY: CPT | Mod: 52

## 2024-09-06 RX ORDER — INCLISIRAN 284 MG/1.5ML
284 INJECTION, SOLUTION SUBCUTANEOUS
Refills: 0 | Status: ACTIVE | COMMUNITY

## 2024-09-06 NOTE — PROCEDURE
[FreeTextEntry1] : left leg phlebectomy  [FreeTextEntry2] : LLE varicose veins with pain and inflammation  [FreeTextEntry3] :  After informed consent was obtained, veins were marked with marking pen.  Site verification occurred, and time-out was performed. The patient was taken to the procedure room, and placed prone on the procedure table. The left leg was prepped and draped in standard fashion.  Tumescent solution was infiltrated in posterior calf where the varicosities were marked.  Using a [place] , 7 stabs were made.  Microphlebectomy hook and [place] used to retrieved the veins in standard rolling and walking out fashion.  Hemostasis was achieved with direct pressure. The leg was cleaned and then a sterile dressing was applied.  Steri-Strips, 4x4s, Kerlix, Coban, and ACE bandage.   The patient tolerated the procedure well and was discharged home.

## 2024-09-09 ENCOUNTER — APPOINTMENT (OUTPATIENT)
Dept: CARDIOLOGY | Facility: CLINIC | Age: 64
End: 2024-09-09
Payer: COMMERCIAL

## 2024-09-09 VITALS
HEART RATE: 74 BPM | WEIGHT: 130 LBS | OXYGEN SATURATION: 99 % | HEIGHT: 64 IN | DIASTOLIC BLOOD PRESSURE: 68 MMHG | SYSTOLIC BLOOD PRESSURE: 116 MMHG | BODY MASS INDEX: 22.2 KG/M2

## 2024-09-09 DIAGNOSIS — E78.49 OTHER HYPERLIPIDEMIA: ICD-10-CM

## 2024-09-09 DIAGNOSIS — Z78.9 OTHER SPECIFIED HEALTH STATUS: ICD-10-CM

## 2024-09-09 DIAGNOSIS — E78.00 PURE HYPERCHOLESTEROLEMIA, UNSPECIFIED: ICD-10-CM

## 2024-09-09 DIAGNOSIS — I25.10 ATHEROSCLEROTIC HEART DISEASE OF NATIVE CORONARY ARTERY W/OUT ANGINA PECTORIS: ICD-10-CM

## 2024-09-09 PROCEDURE — 99213 OFFICE O/P EST LOW 20 MIN: CPT | Mod: 25

## 2024-09-09 PROCEDURE — 96372 THER/PROPH/DIAG INJ SC/IM: CPT | Mod: JZ

## 2024-09-09 RX ORDER — INCLISIRAN 284 MG/1.5ML
284 INJECTION, SOLUTION SUBCUTANEOUS
Refills: 0 | Status: COMPLETED | OUTPATIENT
Start: 2024-09-09

## 2024-09-09 RX ADMIN — INCLISIRAN 1.5 MG/1.5ML: 284 INJECTION, SOLUTION SUBCUTANEOUS at 00:00

## 2024-09-09 NOTE — HISTORY OF PRESENT ILLNESS
[FreeTextEntry1] : BARB MANUEL is a 63 year old F who presents today for cardiovascular prevention visit, 2nd Leqvio shot.   PMH CAD, PCI to LAD 9/2020, familial HLD presents to discuss cholesterol management.   Cardiac catheterization 9/2020 successful PCI to LAD lesion with residual 65% LCX and 50% RCA stenosis.  Her lipids have been elevated given multiple med intolerances over the years. Has had management in her other home in [place] but plans to move to [facility] full time.  She reports trying 5 different statins all which resulted in body pain and joint aches.  She reports zetia had similar effect.  She was on nexletol seems to have tolerated well but has been off since recent intervention.  She tried both Praluent and Repatha which resulted in flu like symptoms.   Most recently had abnormal nuclear stress test, then cardiac cath with PCI to ostial RCA (05/2024). She has been taking low dose crestor 5mg daily since then.  She follows for general cardiology with Dr. Campos.   She otherwise feels well. Remains active. She denies chest pain, pressure, palpitations, unusual shortness of breath, orthopnea, LE edema, lightheadedness, dizziness, near syncope or syncope. Continues to take aspirin.   Lab 2/2024  After 1st Leqvio LDL 90

## 2024-09-09 NOTE — ASSESSMENT
[FreeTextEntry1] : BARB MANUEL is a 63 year old F who presents today for cardiovascular prevention visit, lipid management.   CAD, prior PCI LAD 9/2020 Pt concerned about residual CAD Now s/p abnl nuclear stress test and PCI to ostial RCA (05/2024) Primary management with Dr. Campos Cont asa, lipid lowering as below  Familial HLD Longstanding uncontrolled HLD Known CAD Initial  --> 90 with 1 dose Leqvio  Off nexletol, on low dose crestor Intol several statin, zetia, and PCSK9i mAb 2nd dose Leqvio given today  Followup shot Q6mo thereafter. Informed of potential mild side effects of erythema and tenderness at the injection site, possible mild nasopharyngitis, or rare more serious adverse events including immune reaction.  LDL goal set <55, if not at goal would retrial nexletol which she has in excess at home.   Primary management with Dr. Carl for general cardiology.  Please do not hesitate to call for any questions or concerns.   Sincerely,  FANTA Flor Patients history, testing, and plan reviewed with supervising MD: Dr. Benito Marques

## 2024-09-20 ENCOUNTER — APPOINTMENT (OUTPATIENT)
Dept: VASCULAR SURGERY | Facility: CLINIC | Age: 64
End: 2024-09-20

## 2024-09-20 VITALS
BODY MASS INDEX: 22.2 KG/M2 | SYSTOLIC BLOOD PRESSURE: 122 MMHG | HEART RATE: 71 BPM | HEIGHT: 64 IN | DIASTOLIC BLOOD PRESSURE: 80 MMHG | WEIGHT: 130 LBS | OXYGEN SATURATION: 98 %

## 2024-09-20 PROCEDURE — 99213 OFFICE O/P EST LOW 20 MIN: CPT

## 2024-11-05 ENCOUNTER — RX RENEWAL (OUTPATIENT)
Age: 64
End: 2024-11-05

## 2025-01-27 ENCOUNTER — EMERGENCY (EMERGENCY)
Facility: HOSPITAL | Age: 65
LOS: 1 days | Discharge: LEFT BEFORE DISCHARGE | End: 2025-01-27
Attending: EMERGENCY MEDICINE | Admitting: PSYCHIATRY & NEUROLOGY
Payer: COMMERCIAL

## 2025-01-27 VITALS
SYSTOLIC BLOOD PRESSURE: 134 MMHG | RESPIRATION RATE: 18 BRPM | DIASTOLIC BLOOD PRESSURE: 77 MMHG | OXYGEN SATURATION: 94 % | HEART RATE: 76 BPM

## 2025-01-27 VITALS
TEMPERATURE: 98 F | SYSTOLIC BLOOD PRESSURE: 151 MMHG | WEIGHT: 135.14 LBS | RESPIRATION RATE: 18 BRPM | DIASTOLIC BLOOD PRESSURE: 85 MMHG | HEART RATE: 86 BPM | OXYGEN SATURATION: 98 % | HEIGHT: 65 IN

## 2025-01-27 LAB
ALBUMIN SERPL ELPH-MCNC: 4.7 G/DL — SIGNIFICANT CHANGE UP (ref 3.3–5)
ALP SERPL-CCNC: 64 U/L — SIGNIFICANT CHANGE UP (ref 40–120)
ALT FLD-CCNC: 34 U/L — SIGNIFICANT CHANGE UP (ref 10–45)
ANION GAP SERPL CALC-SCNC: 9 MMOL/L — SIGNIFICANT CHANGE UP (ref 5–17)
APPEARANCE UR: CLEAR — SIGNIFICANT CHANGE UP
APTT BLD: 30.9 SEC — SIGNIFICANT CHANGE UP (ref 24.5–35.6)
AST SERPL-CCNC: 24 U/L — SIGNIFICANT CHANGE UP (ref 10–40)
BASOPHILS # BLD AUTO: 0.08 K/UL — SIGNIFICANT CHANGE UP (ref 0–0.2)
BASOPHILS NFR BLD AUTO: 1 % — SIGNIFICANT CHANGE UP (ref 0–2)
BILIRUB SERPL-MCNC: 0.2 MG/DL — SIGNIFICANT CHANGE UP (ref 0.2–1.2)
BILIRUB UR-MCNC: NEGATIVE — SIGNIFICANT CHANGE UP
BUN SERPL-MCNC: 10 MG/DL — SIGNIFICANT CHANGE UP (ref 7–23)
CALCIUM SERPL-MCNC: 9.2 MG/DL — SIGNIFICANT CHANGE UP (ref 8.4–10.5)
CHLORIDE SERPL-SCNC: 99 MMOL/L — SIGNIFICANT CHANGE UP (ref 96–108)
CO2 SERPL-SCNC: 28 MMOL/L — SIGNIFICANT CHANGE UP (ref 22–31)
COLOR SPEC: YELLOW — SIGNIFICANT CHANGE UP
CREAT SERPL-MCNC: 0.55 MG/DL — SIGNIFICANT CHANGE UP (ref 0.5–1.3)
DIFF PNL FLD: NEGATIVE — SIGNIFICANT CHANGE UP
EGFR: 104 ML/MIN/1.73M2 — SIGNIFICANT CHANGE UP
EOSINOPHIL # BLD AUTO: 0.27 K/UL — SIGNIFICANT CHANGE UP (ref 0–0.5)
EOSINOPHIL NFR BLD AUTO: 3.5 % — SIGNIFICANT CHANGE UP (ref 0–6)
GLUCOSE SERPL-MCNC: 73 MG/DL — SIGNIFICANT CHANGE UP (ref 70–99)
GLUCOSE UR QL: NEGATIVE MG/DL — SIGNIFICANT CHANGE UP
HCT VFR BLD CALC: 42.5 % — SIGNIFICANT CHANGE UP (ref 34.5–45)
HGB BLD-MCNC: 13.7 G/DL — SIGNIFICANT CHANGE UP (ref 11.5–15.5)
IMM GRANULOCYTES NFR BLD AUTO: 0.1 % — SIGNIFICANT CHANGE UP (ref 0–0.9)
INR BLD: 0.95 — SIGNIFICANT CHANGE UP (ref 0.85–1.16)
KETONES UR-MCNC: NEGATIVE MG/DL — SIGNIFICANT CHANGE UP
LEUKOCYTE ESTERASE UR-ACNC: NEGATIVE — SIGNIFICANT CHANGE UP
LYMPHOCYTES # BLD AUTO: 2.38 K/UL — SIGNIFICANT CHANGE UP (ref 1–3.3)
LYMPHOCYTES # BLD AUTO: 30.6 % — SIGNIFICANT CHANGE UP (ref 13–44)
MCHC RBC-ENTMCNC: 28.7 PG — SIGNIFICANT CHANGE UP (ref 27–34)
MCHC RBC-ENTMCNC: 32.2 G/DL — SIGNIFICANT CHANGE UP (ref 32–36)
MCV RBC AUTO: 88.9 FL — SIGNIFICANT CHANGE UP (ref 80–100)
MONOCYTES # BLD AUTO: 0.77 K/UL — SIGNIFICANT CHANGE UP (ref 0–0.9)
MONOCYTES NFR BLD AUTO: 9.9 % — SIGNIFICANT CHANGE UP (ref 2–14)
NEUTROPHILS # BLD AUTO: 4.28 K/UL — SIGNIFICANT CHANGE UP (ref 1.8–7.4)
NEUTROPHILS NFR BLD AUTO: 54.9 % — SIGNIFICANT CHANGE UP (ref 43–77)
NITRITE UR-MCNC: NEGATIVE — SIGNIFICANT CHANGE UP
NRBC # BLD: 0 /100 WBCS — SIGNIFICANT CHANGE UP (ref 0–0)
PH UR: 7 — SIGNIFICANT CHANGE UP (ref 5–8)
PLATELET # BLD AUTO: 365 K/UL — SIGNIFICANT CHANGE UP (ref 150–400)
POTASSIUM SERPL-MCNC: 3.7 MMOL/L — SIGNIFICANT CHANGE UP (ref 3.5–5.3)
POTASSIUM SERPL-SCNC: 3.7 MMOL/L — SIGNIFICANT CHANGE UP (ref 3.5–5.3)
PROT SERPL-MCNC: 7.2 G/DL — SIGNIFICANT CHANGE UP (ref 6–8.3)
PROT UR-MCNC: NEGATIVE MG/DL — SIGNIFICANT CHANGE UP
PROTHROM AB SERPL-ACNC: 11.1 SEC — SIGNIFICANT CHANGE UP (ref 9.9–13.4)
RBC # BLD: 4.78 M/UL — SIGNIFICANT CHANGE UP (ref 3.8–5.2)
RBC # FLD: 11.9 % — SIGNIFICANT CHANGE UP (ref 10.3–14.5)
SODIUM SERPL-SCNC: 136 MMOL/L — SIGNIFICANT CHANGE UP (ref 135–145)
SP GR SPEC: 1.02 — SIGNIFICANT CHANGE UP (ref 1–1.03)
TROPONIN T, HIGH SENSITIVITY RESULT: 7 NG/L — SIGNIFICANT CHANGE UP (ref 0–51)
UROBILINOGEN FLD QL: 0.2 MG/DL — SIGNIFICANT CHANGE UP (ref 0.2–1)
WBC # BLD: 7.79 K/UL — SIGNIFICANT CHANGE UP (ref 3.8–10.5)
WBC # FLD AUTO: 7.79 K/UL — SIGNIFICANT CHANGE UP (ref 3.8–10.5)

## 2025-01-27 PROCEDURE — 99291 CRITICAL CARE FIRST HOUR: CPT

## 2025-01-27 PROCEDURE — 70498 CT ANGIOGRAPHY NECK: CPT | Mod: 26

## 2025-01-27 PROCEDURE — 0042T: CPT | Mod: MC

## 2025-01-27 PROCEDURE — 93005 ELECTROCARDIOGRAM TRACING: CPT

## 2025-01-27 PROCEDURE — 70498 CT ANGIOGRAPHY NECK: CPT | Mod: MC

## 2025-01-27 PROCEDURE — 70450 CT HEAD/BRAIN W/O DYE: CPT | Mod: MC

## 2025-01-27 PROCEDURE — 84484 ASSAY OF TROPONIN QUANT: CPT

## 2025-01-27 PROCEDURE — 36415 COLL VENOUS BLD VENIPUNCTURE: CPT

## 2025-01-27 PROCEDURE — 70496 CT ANGIOGRAPHY HEAD: CPT | Mod: 26

## 2025-01-27 PROCEDURE — 81003 URINALYSIS AUTO W/O SCOPE: CPT

## 2025-01-27 PROCEDURE — 93010 ELECTROCARDIOGRAM REPORT: CPT

## 2025-01-27 PROCEDURE — 71045 X-RAY EXAM CHEST 1 VIEW: CPT

## 2025-01-27 PROCEDURE — 70496 CT ANGIOGRAPHY HEAD: CPT | Mod: MC

## 2025-01-27 PROCEDURE — 99291 CRITICAL CARE FIRST HOUR: CPT | Mod: 25

## 2025-01-27 PROCEDURE — 70450 CT HEAD/BRAIN W/O DYE: CPT | Mod: 26,59

## 2025-01-27 PROCEDURE — 85730 THROMBOPLASTIN TIME PARTIAL: CPT

## 2025-01-27 PROCEDURE — 85025 COMPLETE CBC W/AUTO DIFF WBC: CPT

## 2025-01-27 PROCEDURE — 80053 COMPREHEN METABOLIC PANEL: CPT

## 2025-01-27 PROCEDURE — 82962 GLUCOSE BLOOD TEST: CPT

## 2025-01-27 PROCEDURE — 0042T: CPT

## 2025-01-27 PROCEDURE — 85610 PROTHROMBIN TIME: CPT

## 2025-01-27 PROCEDURE — 71045 X-RAY EXAM CHEST 1 VIEW: CPT | Mod: 26

## 2025-01-27 NOTE — ED ADULT TRIAGE NOTE - NS ED TRIAGE CLINICAL UPGRADE
Yes increase to 100 mg daily and labs as previously ordered for next appointment are fine Deteriorating patient status - Patient was clinically upgraded due to deteriorating patient status.

## 2025-01-27 NOTE — ED PROVIDER NOTE - OBJECTIVE STATEMENT
64F with a PMHx of HLD, CAD with 2 stents on asa and plavix who p/w b/l arm numbness. pt states she noted change in sensation of left arm two days ago and then woke up with right arm numbness and weakness this mornign at 630am. She went to bed at 10:30pm last night. No trauma or fall, no HA, no n/v, no dizziness or syncope, no vision or speech change.  no uri sx, denies hx of similar sx.    Stroke code called upon arrival. 64F with a PMHx of HLD, CAD with 2 stents on asa and plavix who p/w b/l arm numbness. pt states she noted change in sensation of left arm two days ago and then woke up with right arm numbness and weakness this morning at 630am. She went to bed at 10:30pm last night. No trauma or fall, no HA, no n/v, no dizziness or syncope, no vision or speech change.  no uri sx, denies hx of similar sx.    Stroke code called upon arrival.  Pt also states she was sent in from urgent care for concern for acs given she was having left arm symptoms. While she said her arms felt numb and like it was hard to  her bag with her right hand, she also describes it as soreness. She also said she has constant chest soreness since her stents were placed. No increase or change in CP recently.

## 2025-01-27 NOTE — CONSULT NOTE ADULT - ASSESSMENT
64y Female with PMHx of HLD, CAD with 2 stents on asa and plavix who p/w b/l arm numbness. pt states she noted change in sensation of left arm two days ago and then woke up with right arm numbness and subjective weakness this morning at 630am. She went to bed at 10:30pm last night. NIHSS 0. CTs neg. Recommend MRI short stroke protocol to r/o stroke.    Discussed with Dr. Go

## 2025-01-27 NOTE — CONSULT NOTE ADULT - SUBJECTIVE AND OBJECTIVE BOX
**STROKE CODE CONSULT NOTE**    Last known well time/Time of onset of symptoms: last night    HPI: 64y Female with PMHx of HLD, CAD with 2 stents on asa and plavix who p/w b/l arm numbness. pt states she noted change in sensation of left arm two days ago and then woke up with right arm numbness and subjective weakness this morning at 630am. She went to bed at 10:30pm last night. No other focal neurological sx reported.     T(C): 36.6 (25 @ 15:07), Max: 36.6 (25 @ 15:07)  HR: 76 (25 @ 17:30) (76 - 86)  BP: 134/77 (25 @ 17:30) (115/81 - 151/85)  RR: 18 (25 @ 17:30) (18 - 18)  SpO2: 94% (25 @ 17:30) (94% - 99%)    PAST MEDICAL & SURGICAL HISTORY:      FAMILY HISTORY:      ROS:   see HPI    MEDICATIONS  (STANDING):    MEDICATIONS  (PRN):    Allergies    No Known Allergies    Intolerances      Vital Signs Last 24 Hrs  T(C): 36.6 (2025 15:07), Max: 36.6 (2025 15:07)  T(F): 97.9 (2025 15:07), Max: 97.9 (2025 15:07)  HR: 76 (2025 17:30) (76 - 86)  BP: 134/77 (2025 17:30) (115/81 - 151/85)  BP(mean): --  RR: 18 (2025 17:30) (18 - 18)  SpO2: 94% (2025 17:30) (94% - 99%)    Parameters below as of 2025 17:30  Patient On (Oxygen Delivery Method): room air        Neurologic:  -Mental status: Awake, alert, oriented to person, place, and time. Speech is fluent with intact naming, repetition, and comprehension, no dysarthria. Recent and remote memory intact. Follows commands. Attention/concentration intact.   -Cranial nerves:   II: Visual fields are full to confrontation.  III, IV, VI: Extraocular movements are intact without nystagmus. Pupils equally round and reactive to light  V:  Facial sensation V1-V3 equal and intact   VII: Face is symmetric with normal eye closure and smile  Motor: Normal bulk and tone. No pronator drift. Strength bilateral upper extremity 5/5, bilateral lower extremities 5/5.  Sensation: Intact to light touch bilaterally. No neglect or extinction on double simultaneous testing.  Coordination: No dysmetria on finger-to-nose bilaterally  Gait: Narrow gait and steady    NIHSS: 0    Fingerstick Blood Glucose: CAPILLARY BLOOD GLUCOSE  77 (2025 20:27)      POCT Blood Glucose.: 77 mg/dL (2025 15:10)    LABS:                        13.7   7.79  )-----------( 365      ( 2025 15:22 )             42.5         136  |  99  |  10  ----------------------------<  73  3.7   |  28  |  0.55    Ca    9.2      2025 15:22    TPro  7.2  /  Alb  4.7  /  TBili  0.2  /  DBili  x   /  AST  24  /  ALT  34  /  AlkPhos  64      PT/INR - ( 2025 15:22 )   PT: 11.1 sec;   INR: 0.95          PTT - ( 2025 15:22 )  PTT:30.9 sec      Urinalysis Basic - ( 2025 15:49 )    Color: Yellow / Appearance: Clear / S.021 / pH: x  Gluc: x / Ketone: Negative mg/dL  / Bili: Negative / Urobili: 0.2 mg/dL   Blood: x / Protein: Negative mg/dL / Nitrite: Negative   Leuk Esterase: Negative / RBC: x / WBC x   Sq Epi: x / Non Sq Epi: x / Bacteria: x        RADIOLOGY & ADDITIONAL STUDIES:  < from: CT Brain Stroke Protocol (25 @ 15:31) >  IMPRESSION:    CT HEAD: No evidence of acute territorial infarct, intracranial hemorrhage or mass effect.    CT PERFUSION: No perfusion defects are seen.    CT ANGIOGRAPHY NECK: No evidence of cervical arterial dissection or significant stenosis.    CT ANGIOGRAPHY HEAD: No large vessel occlusion or significant proximal stenosis.          -----------------------------------------------------------------------------------------------------------------  IV-tenecteplase:  CRISTAL moncada

## 2025-01-27 NOTE — ED PROVIDER NOTE - PATIENT PORTAL LINK FT
You can access the FollowMyHealth Patient Portal offered by [facility] by registering at the following website: [email or web address]. By joining [facility]’s FollowMyHealth portal, you will also be able to view your health information using other applications (apps) compatible with our system.

## 2025-01-27 NOTE — ED PROVIDER NOTE - CRITICAL CARE ATTENDING CONTRIBUTION TO CARE
Upon my evaluation, this patient had a high probability of imminent or life-threatening deterioration due to   stroke like symptoms  which required my direct attention, intervention, and personal management.    I have personally provided the above minutes of critical care time exclusive of time spent on separately billable procedures. Time includes review of laboratory data, radiology results, discussion with consultants, and monitoring for potential decompensation. Interventions were performed as documented above.

## 2025-01-27 NOTE — ED ADULT NURSE NOTE - NS ED NOTE  TALK SOMEONE YN
No Rehabilitation services Medication teaching and assessment/Rehabilitation services/Teaching and training

## 2025-01-27 NOTE — ED PROVIDER NOTE - PHYSICAL EXAMINATION
GEN: Well appearing, well developed, awake, alert, oriented to person, place, time/situation and in no apparent distress. NTAF  ENT: Airway patent, Nasal mucosa clear. Mouth with normal mucosa.  EYES: Clear bilaterally. PERRL, EOMI  RESPIRATORY: Breathing comfortably with normal RR. No W/C/R, no hypoxia or resp distress.  CARDIAC: Regular rate and rhythm, no M/R/G  ABDOMEN: Soft, nontender, +bowel sounds, no rebound, rigidity, or guarding.  MSK: Range of motion is not limited, no deformities noted.  NEURO: Alert and oriented x 3. Cn 2-12 intact. Slightly decreased strength R hand, pt reports subjectively decreased sensation to R>L UE. Otherwsie Strength 5/5 and sensation intact in all 4 extremities. no pronator drift. Gait normal. Please refer to neuro note for detailed exam.   SKIN: Skin normal color for race, warm, dry and intact. No evidence of rash.  PSYCH: Alert and oriented to person, place, time/situation. normal mood and affect. no apparent risk to self or others. GEN: Well appearing, well developed, awake, alert, oriented to person, place, time/situation and in no apparent distress. NTAF  ENT: Airway patent, Nasal mucosa clear. Mouth with normal mucosa.  EYES: Clear bilaterally. PERRL, EOMI  RESPIRATORY: Breathing comfortably with normal RR. No W/C/R, no hypoxia or resp distress.  CARDIAC: Regular rate and rhythm, no M/R/G  ABDOMEN: Soft, nontender, +bowel sounds, no rebound, rigidity, or guarding.  MSK: Range of motion is not limited, no deformities noted.  NEURO: Alert and oriented x 3. Cn 2-12 intact. Slightly decreased strength R hand, pt reports subjectively decreased sensation to R>L UE. Otherwise Strength 5/5 and sensation intact in all 4 extremities. no pronator drift. Gait normal. Please refer to neuro note for detailed exam.   SKIN: Skin normal color for race, warm, dry and intact. No evidence of rash.  PSYCH: Alert and oriented to person, place, time/situation. normal mood and affect. no apparent risk to self or others.

## 2025-01-27 NOTE — ED ADULT TRIAGE NOTE - CHIEF COMPLAINT QUOTE
Pt presents for B/L arm numbness R > L and R arm weakness on exam which is new per patient. L arm sensation changed 2 days ago. R arm decreased sensation since this morning. LKW 2230 yesterday. Hx of 2 cardiac stents placed in June 2024, HLD. +Plavix, Aspirin. Conversive in clear full sentences. Ambulatory with steady gait independently.

## 2025-01-27 NOTE — ED PROVIDER NOTE - NS ED ATTENDING STATEMENT MOD
Pregnant women with BMI>30 should aim to limit weight gain of 11-20 pounds ideally via healthy diet and regular exercise, as elevated maternal BMI confers increased risks of preeclampsia, GDM, cardiac dysfunction, sleep apnea,  delivery, and VTE, and fetal risks include miscarriage, fetal anomalies (along with reduced detection), and stillbirth, macrosomia and impaired growth. Weight loss via diet and exercise was recommended prior to conception in order to reduce her risk of the aforementioned complications and to optimize pregnancy outcomes. I have personally provided the amount of critical care time documented below excluding time spent on separate procedures.

## 2025-01-27 NOTE — ED ADULT NURSE NOTE - OBJECTIVE STATEMENT
pt is a 65 yo F c/o L arm numbness/weakness for two days, woke up this morning at 630 with numbness to R arm; last known well 2230 last night. denies any dizziness, vision/speech changes, cp, sob, headache, n/v. pmhx cad s/p PCI June 2024 on asa, plavix.  pt upgraded to MD smith, stroke code called. pt in nad, respirations even and unlabored. speaking in full and complete sentences, ambulatory indpendently. moving all extremities with good rom and equal strength. no slurred speech or facial droop appreciated.  pt transported directly to CT from triage. PIV placed, cont cardiac mon initiated.

## 2025-01-27 NOTE — ED PROVIDER NOTE - NSFOLLOWUPINSTRUCTIONS_ED_ALL_ED_FT
1. You were seen for right arm weakness and bilateral arm paresthesia. You chose to leave against medical advice. Please follow up with your doctor or return to the ER for any concerning or worsening symptoms. A copy of any of your resulted labs, imaging, and findings have been provided to you. Make sure to view any test results that may not have yet resulted at the time of your discharge by creating a FollowMyHealth account at: [email or web address] to sign up for FollowMyHealth.   2. Continue to take your home medications as prescribed.   3. Please follow up with your primary care physician. You may call our referrals coordinator at [phone] Monday to Friday 11am-7pm for assistance with making an appointment. Or you can call [phone] to make an appointment.  4. Return to the emergency department for new, persistent, or worsening symptoms or signs, or for any concerning symptoms.   5. For your for health, you should make healthy food choices and be physically active. Also, you should not smoke or use drugs, and you should not drink alcohol in excess. Please visit [email or web address] for resources and more information.

## 2025-01-27 NOTE — ED PROVIDER NOTE - CLINICAL SUMMARY MEDICAL DECISION MAKING FREE TEXT BOX
64F with a PMHx of HLD, CAD with 2 stents on asa and plavix who p/w b/l arm numbness. pt states she noted change in sensation of left arm two days ago and then woke up with right arm numbness and weakness this mornign at 630am. She went to bed at 10:30pm last night. No trauma or fall, no HA, no n/v, no dizziness or syncope, no vision or speech change.  no uri sx, denies hx of similar sx.    Stroke code called upon arrival. 64F with a PMHx of HLD, CAD with 2 stents on asa and plavix who p/w b/l arm numbness. pt states she noted change in sensation of left arm two days ago and then woke up with right arm numbness and weakness this morning at 630am. She went to bed at 10:30pm last night. No trauma or fall, no HA, no n/v, no dizziness or syncope, no vision or speech change.  no uri sx, denies hx of similar sx.    Stroke code called upon arrival.  Pt also states she was sent in from urgent care for concern for acs given she was having left arm symptoms. While she said her arms felt numb and like it was hard to  her bag with her right hand, she also describes it as soreness. She also said she has constant chest soreness since her stents were placed. No increase or change in CP recently.   VSS, pt reports subjective  sens in b/l arms, ?right hand weakness on exam, otherwise no deficits.  EKG no ischemia, labs with no emergent findings, trop neg. Do not suspect ACS, PE, dissection or other acute life-threatening pathology at this time. CTs negative, Stroke recommends MRI stroke protocol tp r/o stroke given patient's symptoms.  Pt states she has to go to [facility] today and she does not want to stay for MRI. She says she feels better. She was advised of risks of leaving AMA and potential of missing stroke but she still wants to leave. All questions answered and tp signed papers and her partner at bedside witnessed ama discussion and signed paperwork as well.

## 2025-01-31 DIAGNOSIS — Z95.5 PRESENCE OF CORONARY ANGIOPLASTY IMPLANT AND GRAFT: ICD-10-CM

## 2025-01-31 DIAGNOSIS — R20.0 ANESTHESIA OF SKIN: ICD-10-CM

## 2025-01-31 DIAGNOSIS — E78.5 HYPERLIPIDEMIA, UNSPECIFIED: ICD-10-CM

## 2025-01-31 DIAGNOSIS — Z79.02 LONG TERM (CURRENT) USE OF ANTITHROMBOTICS/ANTIPLATELETS: ICD-10-CM

## 2025-01-31 DIAGNOSIS — R29.898 OTHER SYMPTOMS AND SIGNS INVOLVING THE MUSCULOSKELETAL SYSTEM: ICD-10-CM

## 2025-01-31 DIAGNOSIS — R07.89 OTHER CHEST PAIN: ICD-10-CM

## 2025-01-31 DIAGNOSIS — Z79.82 LONG TERM (CURRENT) USE OF ASPIRIN: ICD-10-CM

## 2025-01-31 DIAGNOSIS — I25.10 ATHEROSCLEROTIC HEART DISEASE OF NATIVE CORONARY ARTERY WITHOUT ANGINA PECTORIS: ICD-10-CM

## 2025-01-31 DIAGNOSIS — Z53.29 PROCEDURE AND TREATMENT NOT CARRIED OUT BECAUSE OF PATIENT'S DECISION FOR OTHER REASONS: ICD-10-CM

## 2025-02-05 ENCOUNTER — APPOINTMENT (OUTPATIENT)
Dept: CARDIOLOGY | Facility: CLINIC | Age: 65
End: 2025-02-05

## 2025-02-07 ENCOUNTER — NON-APPOINTMENT (OUTPATIENT)
Age: 65
End: 2025-02-07

## 2025-02-07 ENCOUNTER — APPOINTMENT (OUTPATIENT)
Dept: CARDIOLOGY | Facility: CLINIC | Age: 65
End: 2025-02-07
Payer: COMMERCIAL

## 2025-02-07 VITALS
BODY MASS INDEX: 22.71 KG/M2 | OXYGEN SATURATION: 98 % | WEIGHT: 133 LBS | DIASTOLIC BLOOD PRESSURE: 60 MMHG | HEIGHT: 64 IN | HEART RATE: 74 BPM | SYSTOLIC BLOOD PRESSURE: 102 MMHG

## 2025-02-07 DIAGNOSIS — M79.603 PAIN IN ARM, UNSPECIFIED: ICD-10-CM

## 2025-02-07 DIAGNOSIS — E78.00 PURE HYPERCHOLESTEROLEMIA, UNSPECIFIED: ICD-10-CM

## 2025-02-07 DIAGNOSIS — E78.49 OTHER HYPERLIPIDEMIA: ICD-10-CM

## 2025-02-07 DIAGNOSIS — M79.605 PAIN IN LEFT LEG: ICD-10-CM

## 2025-02-07 DIAGNOSIS — Z95.5 PRESENCE OF CORONARY ANGIOPLASTY IMPLANT AND GRAFT: ICD-10-CM

## 2025-02-07 DIAGNOSIS — I10 ESSENTIAL (PRIMARY) HYPERTENSION: ICD-10-CM

## 2025-02-07 PROCEDURE — G2211 COMPLEX E/M VISIT ADD ON: CPT | Mod: NC

## 2025-02-07 PROCEDURE — 99214 OFFICE O/P EST MOD 30 MIN: CPT

## 2025-02-07 RX ORDER — NAPROXEN SODIUM 500 MG/1
500 TABLET, FILM COATED, EXTENDED RELEASE ORAL
Qty: 90 | Refills: 2 | Status: ACTIVE | COMMUNITY
Start: 2025-02-07

## 2025-02-07 RX ORDER — ASPIRIN 325 MG/1
325 TABLET, COATED ORAL
Qty: 90 | Refills: 2 | Status: ACTIVE | COMMUNITY
Start: 2025-02-07

## 2025-02-19 ENCOUNTER — NON-APPOINTMENT (OUTPATIENT)
Age: 65
End: 2025-02-19

## 2025-02-19 LAB — APO LP(A) SERPL-MCNC: 166

## 2025-03-04 ENCOUNTER — APPOINTMENT (OUTPATIENT)
Dept: CARDIOLOGY | Facility: CLINIC | Age: 65
End: 2025-03-04
Payer: COMMERCIAL

## 2025-03-04 PROCEDURE — 78452 HT MUSCLE IMAGE SPECT MULT: CPT

## 2025-03-04 PROCEDURE — A9502: CPT | Mod: JZ

## 2025-03-04 PROCEDURE — 93015 CV STRESS TEST SUPVJ I&R: CPT

## 2025-03-05 ENCOUNTER — APPOINTMENT (OUTPATIENT)
Dept: CARDIOLOGY | Facility: CLINIC | Age: 65
End: 2025-03-05
Payer: COMMERCIAL

## 2025-03-05 VITALS
OXYGEN SATURATION: 98 % | WEIGHT: 129 LBS | SYSTOLIC BLOOD PRESSURE: 120 MMHG | DIASTOLIC BLOOD PRESSURE: 72 MMHG | BODY MASS INDEX: 22.14 KG/M2 | HEART RATE: 76 BPM

## 2025-03-05 DIAGNOSIS — E78.41 ELEVATED LIPOPROTEIN(A): ICD-10-CM

## 2025-03-05 DIAGNOSIS — Z78.9 OTHER SPECIFIED HEALTH STATUS: ICD-10-CM

## 2025-03-05 DIAGNOSIS — E78.00 PURE HYPERCHOLESTEROLEMIA, UNSPECIFIED: ICD-10-CM

## 2025-03-05 DIAGNOSIS — I10 ESSENTIAL (PRIMARY) HYPERTENSION: ICD-10-CM

## 2025-03-05 DIAGNOSIS — I25.10 ATHEROSCLEROTIC HEART DISEASE OF NATIVE CORONARY ARTERY W/OUT ANGINA PECTORIS: ICD-10-CM

## 2025-03-05 DIAGNOSIS — E78.49 OTHER HYPERLIPIDEMIA: ICD-10-CM

## 2025-03-05 PROCEDURE — 99213 OFFICE O/P EST LOW 20 MIN: CPT | Mod: 25

## 2025-03-05 PROCEDURE — 96372 THER/PROPH/DIAG INJ SC/IM: CPT | Mod: JZ

## 2025-03-05 RX ORDER — INCLISIRAN 284 MG/1.5ML
284 INJECTION, SOLUTION SUBCUTANEOUS
Refills: 0 | Status: COMPLETED | OUTPATIENT
Start: 2025-03-05

## 2025-03-05 RX ADMIN — INCLISIRAN 1.5 MG/1.5ML: 284 INJECTION, SOLUTION SUBCUTANEOUS at 00:00

## 2025-03-26 ENCOUNTER — APPOINTMENT (OUTPATIENT)
Dept: CARDIOLOGY | Facility: CLINIC | Age: 65
End: 2025-03-26
Payer: COMMERCIAL

## 2025-03-26 DIAGNOSIS — E78.49 OTHER HYPERLIPIDEMIA: ICD-10-CM

## 2025-03-26 DIAGNOSIS — I10 ESSENTIAL (PRIMARY) HYPERTENSION: ICD-10-CM

## 2025-03-26 DIAGNOSIS — I25.10 ATHEROSCLEROTIC HEART DISEASE OF NATIVE CORONARY ARTERY W/OUT ANGINA PECTORIS: ICD-10-CM

## 2025-03-26 DIAGNOSIS — Z78.9 OTHER SPECIFIED HEALTH STATUS: ICD-10-CM

## 2025-03-26 DIAGNOSIS — Z95.5 PRESENCE OF CORONARY ANGIOPLASTY IMPLANT AND GRAFT: ICD-10-CM

## 2025-03-26 DIAGNOSIS — E78.00 PURE HYPERCHOLESTEROLEMIA, UNSPECIFIED: ICD-10-CM

## 2025-03-26 PROCEDURE — 99214 OFFICE O/P EST MOD 30 MIN: CPT | Mod: 95

## 2025-03-26 PROCEDURE — G2211 COMPLEX E/M VISIT ADD ON: CPT | Mod: NC,95

## 2025-03-28 ENCOUNTER — NON-APPOINTMENT (OUTPATIENT)
Age: 65
End: 2025-03-28

## 2025-04-22 ENCOUNTER — NON-APPOINTMENT (OUTPATIENT)
Age: 65
End: 2025-04-22

## 2025-09-17 ENCOUNTER — APPOINTMENT (OUTPATIENT)
Dept: CARDIOLOGY | Facility: CLINIC | Age: 65
End: 2025-09-17

## 2025-09-17 DIAGNOSIS — I25.10 ATHEROSCLEROTIC HEART DISEASE OF NATIVE CORONARY ARTERY W/OUT ANGINA PECTORIS: ICD-10-CM

## 2025-09-17 DIAGNOSIS — E78.41 ELEVATED LIPOPROTEIN(A): ICD-10-CM

## 2025-09-17 DIAGNOSIS — Z78.9 OTHER SPECIFIED HEALTH STATUS: ICD-10-CM

## 2025-09-17 DIAGNOSIS — E78.00 PURE HYPERCHOLESTEROLEMIA, UNSPECIFIED: ICD-10-CM

## 2025-09-17 LAB — APO B SERPL-MCNC: 87

## 2025-09-17 RX ORDER — INCLISIRAN 284 MG/1.5ML
284 INJECTION, SOLUTION SUBCUTANEOUS
Refills: 0 | Status: ACTIVE | COMMUNITY

## 2025-09-17 RX ORDER — INCLISIRAN 284 MG/1.5ML
284 INJECTION, SOLUTION SUBCUTANEOUS
Qty: 0 | Refills: 0 | Status: COMPLETED | OUTPATIENT
Start: 2025-09-17

## 2025-09-17 RX ADMIN — INCLISIRAN 0 MG/1.5ML: 284 INJECTION, SOLUTION SUBCUTANEOUS at 00:00
